# Patient Record
Sex: FEMALE | NOT HISPANIC OR LATINO | Employment: FULL TIME | URBAN - METROPOLITAN AREA
[De-identification: names, ages, dates, MRNs, and addresses within clinical notes are randomized per-mention and may not be internally consistent; named-entity substitution may affect disease eponyms.]

---

## 2023-07-16 DIAGNOSIS — E78.5 HYPERLIPIDEMIA, UNSPECIFIED: ICD-10-CM

## 2023-07-17 RX ORDER — PRAVASTATIN SODIUM 40 MG/1
TABLET ORAL
Qty: 30 TABLET | Refills: 5 | Status: SHIPPED | OUTPATIENT
Start: 2023-07-17 | End: 2024-02-05 | Stop reason: SDUPTHER

## 2024-01-06 DIAGNOSIS — E03.9 ACQUIRED HYPOTHYROIDISM: Primary | ICD-10-CM

## 2024-01-08 RX ORDER — LEVOTHYROXINE SODIUM 50 UG/1
50 TABLET ORAL
Qty: 30 TABLET | Refills: 1 | Status: SHIPPED | OUTPATIENT
Start: 2024-01-08 | End: 2024-02-01

## 2024-02-01 DIAGNOSIS — E03.9 ACQUIRED HYPOTHYROIDISM: ICD-10-CM

## 2024-02-01 RX ORDER — LEVOTHYROXINE SODIUM 50 UG/1
50 TABLET ORAL
Qty: 90 TABLET | Refills: 0 | Status: SHIPPED | OUTPATIENT
Start: 2024-02-01 | End: 2024-02-21 | Stop reason: SDUPTHER

## 2024-02-05 DIAGNOSIS — E78.5 HYPERLIPIDEMIA, UNSPECIFIED: ICD-10-CM

## 2024-02-05 RX ORDER — PRAVASTATIN SODIUM 40 MG/1
40 TABLET ORAL DAILY
Qty: 90 TABLET | Refills: 3 | Status: SHIPPED | OUTPATIENT
Start: 2024-02-05

## 2024-02-14 NOTE — PROGRESS NOTES
Subjective   Reason for Visit: Jose Flores is an 63 y.o. female here for a CPE     Chief Complaint   Patient presents with    Annual Exam       HPI  Jose is an est 62 yo female presenting today for Annual CPE     PMH: HYPOTHYROIDISM AND HLD     Occupation: Works FT as caregiver with dementia patients     Do you take any herbs or supplements that were not prescribed by a doctor? No  Colon Cancer Screening: Due   LMP:  PAP: 2022  Mammogram:  DUE    GYN: N/A   Fasting blood work: Due    Pt c/o sharp pain in RIGHT lateral thigh  Thinks it may be coming from her RIGHT lower back  Occurs most often during or after work, when she has been moving patients  Pt is wearing compression stockings and supportive shoes at work  Takes 2 ibuprofen (400 mg) and it reduces the pain  No falls    Pt also c/o burning with urination   Onset: 2 days  Will check IO UA       Office Visit on 02/15/2024   Component Date Value Ref Range Status    POC Color, Urine 02/15/2024 Yellow  Straw, Yellow, Light-Yellow Final    POC Appearance, Urine 02/15/2024 Clear  Clear Final    POC Glucose, Urine 02/15/2024 NEGATIVE  NEGATIVE mg/dl Final    POC Bilirubin, Urine 02/15/2024 NEGATIVE  NEGATIVE Final    POC Ketones, Urine 02/15/2024 TRACE (A)  NEGATIVE mg/dl Final    POC Specific Gravity, Urine 02/15/2024 1.025  1.005 - 1.035 Final    POC Blood, Urine 02/15/2024 TRACE-Intact (A)  NEGATIVE Final    POC PH, Urine 02/15/2024 6.5  No Reference Range Established PH Final    POC Protein, Urine 02/15/2024 TRACE (A)  NEGATIVE, 30 (1+) mg/dl Final    POC Urobilinogen, Urine 02/15/2024 0.2  0.2, 1.0 EU/DL Final    Poc Nitrite, Urine 02/15/2024 NEGATIVE  NEGATIVE Final    POC Leukocytes, Urine 02/15/2024 NEGATIVE  NEGATIVE Final         Patient Care Team:  ALTAGRACIA Pfeiffer-CNP as PCP - General (Family Medicine)     Review of Systems  All 13 systems were reviewed and are within normal limits except positive and pertinent negative responses which are noted  "below or in HPI.      Objective   Vitals:  /84 (BP Location: Right arm)   Pulse 85   Ht 1.626 m (5' 4\")   Wt 79.4 kg (175 lb)   SpO2 96%   BMI 30.04 kg/m²       Office Visit on 02/15/2024   Component Date Value Ref Range Status    POC Color, Urine 02/15/2024 Yellow  Straw, Yellow, Light-Yellow Final    POC Appearance, Urine 02/15/2024 Clear  Clear Final    POC Glucose, Urine 02/15/2024 NEGATIVE  NEGATIVE mg/dl Final    POC Bilirubin, Urine 02/15/2024 NEGATIVE  NEGATIVE Final    POC Ketones, Urine 02/15/2024 TRACE (A)  NEGATIVE mg/dl Final    POC Specific Gravity, Urine 02/15/2024 1.025  1.005 - 1.035 Final    POC Blood, Urine 02/15/2024 TRACE-Intact (A)  NEGATIVE Final    POC PH, Urine 02/15/2024 6.5  No Reference Range Established PH Final    POC Protein, Urine 02/15/2024 TRACE (A)  NEGATIVE, 30 (1+) mg/dl Final    POC Urobilinogen, Urine 02/15/2024 0.2  0.2, 1.0 EU/DL Final    Poc Nitrite, Urine 02/15/2024 NEGATIVE  NEGATIVE Final    POC Leukocytes, Urine 02/15/2024 NEGATIVE  NEGATIVE Final         Current Outpatient Medications   Medication Instructions    levothyroxine (SYNTHROID, LEVOXYL) 50 mcg, oral, Daily before breakfast    pravastatin (PRAVACHOL) 40 mg, oral, Daily       Physical Exam  Vitals reviewed.   HENT:      Right Ear: Tympanic membrane normal.      Left Ear: Tympanic membrane normal.      Mouth/Throat:      Mouth: Mucous membranes are moist.   Eyes:      Conjunctiva/sclera: Conjunctivae normal.   Cardiovascular:      Pulses: Normal pulses.      Heart sounds: Normal heart sounds.   Pulmonary:      Effort: Pulmonary effort is normal.   Abdominal:      General: Bowel sounds are normal.   Musculoskeletal:        Legs:    Skin:     General: Skin is warm and dry.      Capillary Refill: Capillary refill takes less than 2 seconds.   Neurological:      Mental Status: She is alert.   Psychiatric:         Mood and Affect: Mood normal.         Behavior: Behavior normal.         Thought Content: " Thought content normal.         Assessment/Plan   Problem List Items Addressed This Visit             ICD-10-CM    Dyslipidemia E78.5    Relevant Orders    Comprehensive Metabolic Panel    Lipid Panel    Hypothyroidism E03.9    Relevant Orders    TSH with reflex to Free T4 if abnormal    Annual physical exam - Primary Z00.00     Other Visit Diagnoses         Codes    Encounter for screening for malignant neoplasm of colon     Z12.11    Relevant Orders    Cologuard® colon cancer screening    Burning with urination     R30.0    Relevant Orders    POCT UA Automated manually resulted (Completed)    Breast screening     Z12.39    Relevant Orders    BI mammo bilateral screening tomosynthesis

## 2024-02-15 ENCOUNTER — OFFICE VISIT (OUTPATIENT)
Dept: PRIMARY CARE | Facility: CLINIC | Age: 64
End: 2024-02-15
Payer: COMMERCIAL

## 2024-02-15 VITALS
BODY MASS INDEX: 29.88 KG/M2 | OXYGEN SATURATION: 96 % | HEIGHT: 64 IN | SYSTOLIC BLOOD PRESSURE: 123 MMHG | DIASTOLIC BLOOD PRESSURE: 84 MMHG | WEIGHT: 175 LBS | HEART RATE: 85 BPM

## 2024-02-15 DIAGNOSIS — Z12.39 BREAST SCREENING: ICD-10-CM

## 2024-02-15 DIAGNOSIS — E78.5 DYSLIPIDEMIA: ICD-10-CM

## 2024-02-15 DIAGNOSIS — R30.0 BURNING WITH URINATION: ICD-10-CM

## 2024-02-15 DIAGNOSIS — E03.9 ACQUIRED HYPOTHYROIDISM: ICD-10-CM

## 2024-02-15 DIAGNOSIS — Z00.00 ANNUAL PHYSICAL EXAM: Primary | ICD-10-CM

## 2024-02-15 DIAGNOSIS — Z12.11 ENCOUNTER FOR SCREENING FOR MALIGNANT NEOPLASM OF COLON: ICD-10-CM

## 2024-02-15 LAB
POC APPEARANCE, URINE: CLEAR
POC BILIRUBIN, URINE: NEGATIVE
POC BLOOD, URINE: ABNORMAL
POC COLOR, URINE: YELLOW
POC GLUCOSE, URINE: NEGATIVE MG/DL
POC KETONES, URINE: ABNORMAL MG/DL
POC LEUKOCYTES, URINE: NEGATIVE
POC NITRITE,URINE: NEGATIVE
POC PH, URINE: 6.5 PH
POC PROTEIN, URINE: ABNORMAL MG/DL
POC SPECIFIC GRAVITY, URINE: 1.02
POC UROBILINOGEN, URINE: 0.2 EU/DL

## 2024-02-15 PROCEDURE — 99396 PREV VISIT EST AGE 40-64: CPT | Performed by: NURSE PRACTITIONER

## 2024-02-15 PROCEDURE — 4004F PT TOBACCO SCREEN RCVD TLK: CPT | Performed by: NURSE PRACTITIONER

## 2024-02-15 PROCEDURE — 81003 URINALYSIS AUTO W/O SCOPE: CPT | Performed by: NURSE PRACTITIONER

## 2024-02-15 NOTE — PATIENT INSTRUCTIONS
Thank you for seeing me today.  It was a pleasure to see you again!    Today we did your Annual Physical Exam and discussed the following:     Your IN-OFFICE urine test was: NEGATIVE     #IT BAND SYNDROME, RIGHT   Try OTC Aleve 1-2 tabs/day  Ice and heat alternating   Stretches as provided in office.    Schedule Mammogram    Lab work ordered today.  Please have your blood drawn in the next 1-2 weeks.  You need to be FASTING for 12 hours prior to blood draw.  You may only have water.  Please contact your insurance company to ask about the best location to get blood drawn.  We will contact you with the results of your blood work and any necessary adjustments  to your plan of care, if you do not hear from us within 3-5 days of having your blood drawn, please call the office at [phone].    RTC ANNUALLY AND AS NEEDED

## 2024-02-20 ENCOUNTER — LAB (OUTPATIENT)
Dept: LAB | Facility: LAB | Age: 64
End: 2024-02-20
Payer: COMMERCIAL

## 2024-02-20 DIAGNOSIS — E03.9 ACQUIRED HYPOTHYROIDISM: ICD-10-CM

## 2024-02-20 DIAGNOSIS — E78.5 DYSLIPIDEMIA: ICD-10-CM

## 2024-02-20 LAB
ALBUMIN SERPL BCP-MCNC: 4.2 G/DL (ref 3.4–5)
ALP SERPL-CCNC: 76 U/L (ref 33–136)
ALT SERPL W P-5'-P-CCNC: 14 U/L (ref 7–45)
ANION GAP SERPL CALC-SCNC: 10 MMOL/L (ref 10–20)
AST SERPL W P-5'-P-CCNC: 16 U/L (ref 9–39)
BILIRUB SERPL-MCNC: 0.7 MG/DL (ref 0–1.2)
BUN SERPL-MCNC: 16 MG/DL (ref 6–23)
CALCIUM SERPL-MCNC: 9.6 MG/DL (ref 8.6–10.3)
CHLORIDE SERPL-SCNC: 104 MMOL/L (ref 98–107)
CHOLEST SERPL-MCNC: 201 MG/DL (ref 0–199)
CHOLESTEROL/HDL RATIO: 3.1
CO2 SERPL-SCNC: 29 MMOL/L (ref 21–32)
CREAT SERPL-MCNC: 0.75 MG/DL (ref 0.5–1.05)
EGFRCR SERPLBLD CKD-EPI 2021: 90 ML/MIN/1.73M*2
GLUCOSE SERPL-MCNC: 95 MG/DL (ref 74–99)
HDLC SERPL-MCNC: 65 MG/DL
LDLC SERPL CALC-MCNC: 111 MG/DL
NON HDL CHOLESTEROL: 136 MG/DL (ref 0–149)
POTASSIUM SERPL-SCNC: 4.4 MMOL/L (ref 3.5–5.3)
PROT SERPL-MCNC: 6.8 G/DL (ref 6.4–8.2)
SODIUM SERPL-SCNC: 139 MMOL/L (ref 136–145)
TRIGL SERPL-MCNC: 126 MG/DL (ref 0–149)
TSH SERPL-ACNC: 3.74 MIU/L (ref 0.44–3.98)
VLDL: 25 MG/DL (ref 0–40)

## 2024-02-20 PROCEDURE — 80053 COMPREHEN METABOLIC PANEL: CPT

## 2024-02-20 PROCEDURE — 36415 COLL VENOUS BLD VENIPUNCTURE: CPT

## 2024-02-20 PROCEDURE — 84443 ASSAY THYROID STIM HORMONE: CPT

## 2024-02-20 PROCEDURE — 80061 LIPID PANEL: CPT

## 2024-02-21 DIAGNOSIS — E03.9 ACQUIRED HYPOTHYROIDISM: ICD-10-CM

## 2024-02-21 RX ORDER — LEVOTHYROXINE SODIUM 50 UG/1
50 TABLET ORAL
Qty: 90 TABLET | Refills: 3 | Status: SHIPPED | OUTPATIENT
Start: 2024-02-21

## 2024-02-29 ENCOUNTER — HOSPITAL ENCOUNTER (OUTPATIENT)
Dept: RADIOLOGY | Facility: HOSPITAL | Age: 64
Discharge: HOME | End: 2024-02-29
Payer: COMMERCIAL

## 2024-02-29 VITALS — WEIGHT: 175 LBS | HEIGHT: 64 IN | BODY MASS INDEX: 29.88 KG/M2

## 2024-02-29 DIAGNOSIS — Z12.39 BREAST SCREENING: ICD-10-CM

## 2024-02-29 PROCEDURE — 77063 BREAST TOMOSYNTHESIS BI: CPT | Performed by: RADIOLOGY

## 2024-02-29 PROCEDURE — 77067 SCR MAMMO BI INCL CAD: CPT | Performed by: RADIOLOGY

## 2024-02-29 PROCEDURE — 77067 SCR MAMMO BI INCL CAD: CPT

## 2024-03-09 LAB — NONINV COLON CA DNA+OCC BLD SCRN STL QL: NEGATIVE

## 2024-09-26 ENCOUNTER — OFFICE VISIT (OUTPATIENT)
Dept: URGENT CARE | Age: 64
End: 2024-09-26
Payer: COMMERCIAL

## 2024-09-26 VITALS
DIASTOLIC BLOOD PRESSURE: 84 MMHG | SYSTOLIC BLOOD PRESSURE: 119 MMHG | HEART RATE: 81 BPM | WEIGHT: 155 LBS | TEMPERATURE: 98.6 F | BODY MASS INDEX: 26.61 KG/M2

## 2024-09-26 DIAGNOSIS — J02.9 SORE THROAT: ICD-10-CM

## 2024-09-26 DIAGNOSIS — S16.1XXA STRAIN OF NECK MUSCLE, INITIAL ENCOUNTER: Primary | ICD-10-CM

## 2024-09-26 LAB — POC RAPID STREP: NEGATIVE

## 2024-09-26 RX ORDER — CYCLOBENZAPRINE HCL 5 MG
5 TABLET ORAL 3 TIMES DAILY
Qty: 30 TABLET | Refills: 0 | Status: SHIPPED | OUTPATIENT
Start: 2024-09-26 | End: 2024-09-26

## 2024-09-26 RX ORDER — METHYLPREDNISOLONE 4 MG/1
TABLET ORAL
Qty: 21 TABLET | Refills: 0 | Status: SHIPPED | OUTPATIENT
Start: 2024-09-26 | End: 2024-09-26

## 2024-09-26 RX ORDER — METHYLPREDNISOLONE 4 MG/1
TABLET ORAL
Qty: 21 TABLET | Refills: 0 | Status: SHIPPED | OUTPATIENT
Start: 2024-09-26

## 2024-09-26 RX ORDER — CYCLOBENZAPRINE HCL 5 MG
5 TABLET ORAL 3 TIMES DAILY
Qty: 30 TABLET | Refills: 0 | Status: SHIPPED | OUTPATIENT
Start: 2024-09-26 | End: 2024-10-06

## 2024-09-26 ASSESSMENT — ENCOUNTER SYMPTOMS
CHILLS: 0
SHORTNESS OF BREATH: 0
NECK STIFFNESS: 1
FATIGUE: 0
VOMITING: 0
DIARRHEA: 0
HEADACHES: 0
NECK PAIN: 1
ABDOMINAL PAIN: 0
BACK PAIN: 1
COUGH: 0
FEVER: 0
NAUSEA: 0
SORE THROAT: 0

## 2024-09-26 NOTE — LETTER
September 26, 2024     Patient: Jose Flores   YOB: 1960   Date of Visit: 9/26/2024       To Whom It May Concern:    It is my medical opinion that Jose Flores may return to work on 9/28/24 .    If you have any questions or concerns, please don't hesitate to call.         Sincerely,        Crystal L Severino, ALTAGRACIA-CNP    CC: No Recipients

## 2024-09-26 NOTE — PROGRESS NOTES
Subjective   Patient ID: Jose Flores is a 64 y.o. female. They present today with a chief complaint of Back Pain (Pt presents with back pain, neck pain and sore throat).    History of Present Illness  64-year-old female presents with complaints of neck pain with some radiation into her shoulders.  She states she is having difficulty with turning her head left or right and does have a slight headache.  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  She denies any headache, numbness or tingling down the arms.      History provided by:  Patient  History limited by: na.   used: No        Past Medical History  Allergies as of 09/26/2024    (No Known Allergies)       (Not in a hospital admission)       Past Medical History:   Diagnosis Date    Immunization not carried out because of patient refusal 06/22/2020    Influenza vaccination declined    Pain in unspecified knee 01/14/2014    Joint pain, knee    Personal history of (healed) traumatic fracture 02/26/2019    History of fracture of phalanx of finger    Personal history of other diseases of urinary system 02/26/2019    History of hematuria    Sprain of anterior cruciate ligament of unspecified knee, initial encounter 01/14/2014    Complete tear of anterior cruciate ligament of knee       Past Surgical History:   Procedure Laterality Date    OTHER SURGICAL HISTORY  02/26/2019    Finger surgical procedure        reports that she has been smoking cigarettes. She has a 2.5 pack-year smoking history. She has never used smokeless tobacco. She reports current alcohol use of about 3.0 standard drinks of alcohol per week. She reports that she does not use drugs.    Review of Systems  Review of Systems   Constitutional:  Negative for chills, fatigue and fever.   HENT:  Negative for congestion, ear pain, postnasal drip and sore throat.    Respiratory:  Negative for  cough and shortness of breath.    Cardiovascular:  Negative for chest pain.   Gastrointestinal:  Negative for abdominal pain, diarrhea, nausea and vomiting.   Musculoskeletal:  Positive for neck pain and neck stiffness.   Neurological:  Negative for headaches.                                  Objective    Vitals:    09/26/24 1529   BP: 119/84   BP Location: Right arm   Pulse: 81   Temp: 37 °C (98.6 °F)   Weight: 70.3 kg (155 lb)     No LMP recorded. Patient is postmenopausal.    Physical Exam  Vitals and nursing note reviewed.   Constitutional:       Appearance: Normal appearance.   HENT:      Head: Normocephalic.      Right Ear: Tympanic membrane normal.      Left Ear: Tympanic membrane normal.      Nose: Nose normal.      Mouth/Throat:      Mouth: Mucous membranes are moist.   Eyes:      Pupils: Pupils are equal, round, and reactive to light.   Neck:     Cardiovascular:      Rate and Rhythm: Normal rate and regular rhythm.   Pulmonary:      Effort: Pulmonary effort is normal.      Breath sounds: Normal breath sounds.   Abdominal:      General: Bowel sounds are normal.      Palpations: Abdomen is soft.   Musculoskeletal:      Cervical back: Neck supple. No edema, erythema, rigidity or crepitus. Pain with movement and muscular tenderness present. No spinous process tenderness. Decreased range of motion.   Skin:     General: Skin is warm and dry.      Capillary Refill: Capillary refill takes less than 2 seconds.   Neurological:      General: No focal deficit present.      Mental Status: She is alert.   Psychiatric:         Mood and Affect: Mood normal.         Procedures    Point of Care Test & Imaging Results from this visit  No results found for this visit on 09/26/24.   No results found.    Diagnostic study results (if any) were reviewed by Crystal L Severino, APRN-CNP.    Assessment/Plan   Allergies, medications, history, and pertinent labs/EKGs/Imaging reviewed by Crystal L Severino, APRN-CNP.     Medical  Decision Making  At time of discharge patient was clinically well-appearing and HDS for outpatient management. The patient and/or family was educated regarding diagnosis, supportive care, OTC and Rx medications. The patient and/or family was given the opportunity to ask questions prior to discharge.  They verbalized understanding of my discussion of the plans for treatment, expected course, indications to return to  or seek further evaluation in ED, and the need for timely follow up as directed.   They were provided with a work/school excuse if requested.      Orders and Diagnoses  Diagnoses and all orders for this visit:  Strain of neck muscle, initial encounter  -     cyclobenzaprine (Flexeril) 5 mg tablet; Take 1 tablet (5 mg) by mouth 3 times a day for 10 days.  -     methylPREDNISolone (Medrol Dospak) 4 mg tablets; Take as directed on package.  Sore throat  -     POCT rapid strep A manually resulted      Medical Admin Record      Patient disposition: Home    Electronically signed by Crystal L Severino, APRN-CNP  3:42 PM

## 2025-01-31 ENCOUNTER — APPOINTMENT (OUTPATIENT)
Dept: PRIMARY CARE | Facility: CLINIC | Age: 65
End: 2025-01-31
Payer: COMMERCIAL

## 2025-02-13 ENCOUNTER — APPOINTMENT (OUTPATIENT)
Dept: OPHTHALMOLOGY | Facility: CLINIC | Age: 65
End: 2025-02-13
Payer: COMMERCIAL

## 2025-02-13 DIAGNOSIS — H52.4 HYPEROPIA WITH PRESBYOPIA OF BOTH EYES: Primary | ICD-10-CM

## 2025-02-13 DIAGNOSIS — H52.03 HYPEROPIA WITH PRESBYOPIA OF BOTH EYES: Primary | ICD-10-CM

## 2025-02-13 PROCEDURE — 92004 COMPRE OPH EXAM NEW PT 1/>: CPT | Performed by: STUDENT IN AN ORGANIZED HEALTH CARE EDUCATION/TRAINING PROGRAM

## 2025-02-13 PROCEDURE — 92015 DETERMINE REFRACTIVE STATE: CPT | Performed by: STUDENT IN AN ORGANIZED HEALTH CARE EDUCATION/TRAINING PROGRAM

## 2025-02-13 ASSESSMENT — ENCOUNTER SYMPTOMS
HEMATOLOGIC/LYMPHATIC NEGATIVE: 0
RESPIRATORY NEGATIVE: 0
MUSCULOSKELETAL NEGATIVE: 0
NEUROLOGICAL NEGATIVE: 0
ENDOCRINE NEGATIVE: 0
PSYCHIATRIC NEGATIVE: 0
CONSTITUTIONAL NEGATIVE: 0
CARDIOVASCULAR NEGATIVE: 0
EYES NEGATIVE: 1
ALLERGIC/IMMUNOLOGIC NEGATIVE: 0
GASTROINTESTINAL NEGATIVE: 0

## 2025-02-13 ASSESSMENT — REFRACTION_MANIFEST
OD_CYLINDER: SPHERE
OS_CYLINDER: +0.50
OD_SPHERE: +1.00
OS_AXIS: 015
OS_ADD: +2.25
OD_ADD: +2.25
OS_SPHERE: +0.50

## 2025-02-13 ASSESSMENT — CONF VISUAL FIELD
METHOD: COUNTING FINGERS
OS_SUPERIOR_NASAL_RESTRICTION: 0
OD_NORMAL: 1
OD_SUPERIOR_NASAL_RESTRICTION: 0
OS_INFERIOR_TEMPORAL_RESTRICTION: 0
OD_INFERIOR_TEMPORAL_RESTRICTION: 0
OS_INFERIOR_NASAL_RESTRICTION: 0
OS_SUPERIOR_TEMPORAL_RESTRICTION: 0
OD_INFERIOR_NASAL_RESTRICTION: 0
OD_SUPERIOR_TEMPORAL_RESTRICTION: 0
OS_NORMAL: 1

## 2025-02-13 ASSESSMENT — SLIT LAMP EXAM - LIDS
COMMENTS: NORMAL
COMMENTS: NORMAL

## 2025-02-13 ASSESSMENT — VISUAL ACUITY
METHOD: SNELLEN - LINEAR
OD_SC+: -1
OD_SC: 20/25
OS_SC: 20/20

## 2025-02-13 ASSESSMENT — TONOMETRY
IOP_METHOD: TONOPEN
OD_IOP_MMHG: 15
OS_IOP_MMHG: 17

## 2025-02-13 ASSESSMENT — EXTERNAL EXAM - RIGHT EYE: OD_EXAM: NORMAL

## 2025-02-13 ASSESSMENT — CUP TO DISC RATIO
OS_RATIO: .30
OD_RATIO: .30

## 2025-02-13 ASSESSMENT — EXTERNAL EXAM - LEFT EYE: OS_EXAM: NORMAL

## 2025-02-13 NOTE — PROGRESS NOTES
Assessment/Plan   Diagnoses and all orders for this visit:  Hyperopia with presbyopia of both eyes  -New spec rx released today per patient request. Ocular health wnl for age OU. Monitor 1 year or sooner prn. Refraction billed today.    RTC 1 year for annual with DICKE and KEREN

## 2025-04-03 DIAGNOSIS — E03.9 ACQUIRED HYPOTHYROIDISM: ICD-10-CM

## 2025-04-04 RX ORDER — LEVOTHYROXINE SODIUM 50 UG/1
50 TABLET ORAL
Qty: 90 TABLET | Refills: 0 | Status: SHIPPED | OUTPATIENT
Start: 2025-04-04

## 2025-07-03 ENCOUNTER — TELEPHONE (OUTPATIENT)
Dept: PRIMARY CARE | Facility: CLINIC | Age: 65
End: 2025-07-03
Payer: COMMERCIAL

## 2025-07-03 DIAGNOSIS — E03.9 ACQUIRED HYPOTHYROIDISM: ICD-10-CM

## 2025-07-03 DIAGNOSIS — E78.5 HYPERLIPIDEMIA, UNSPECIFIED: ICD-10-CM

## 2025-07-03 RX ORDER — LEVOTHYROXINE SODIUM 50 UG/1
50 TABLET ORAL
Qty: 30 TABLET | Refills: 0 | Status: SHIPPED | OUTPATIENT
Start: 2025-07-03

## 2025-07-03 RX ORDER — PRAVASTATIN SODIUM 40 MG/1
40 TABLET ORAL DAILY
Qty: 30 TABLET | Refills: 0 | Status: SHIPPED | OUTPATIENT
Start: 2025-07-03

## 2025-07-03 NOTE — TELEPHONE ENCOUNTER
Patient called in requesting refills of levothyroxine 50 mcg, states took last one this morning. Would also like refill of Pravachol 40mg be sent to M on file.     Last office visit: 2/15/2024  Next office visit: 7/9/2025

## 2025-07-08 NOTE — PROGRESS NOTES
"Subjective   Chief Complaint   Patient presents with    Follow-up     Jose Flores is a 64 y.o. female is here today for a follow up for medication refills.        Patient ID: Jose Flores is a 64 y.o. female who presents for Follow-up (Jose Flores is a 64 y.o. female is here today for a follow up for medication refills. ).    HPI  Jose presents today for medication refill   LOV: FEB 2024     Dx: HYPOTHYROIDISM, HLD, OA    Pt presents today for refill on levothyroxine   Has not been seen for > 1 yr    Has been seeing ortho for chronic OA hip  Taking Ibuprofen and its not helping  Will try Meloxicam    Pt is due for FBW, Mammogram and vaccines        RX Allergies[1]    Review of Systems  ROS was completed and all systems are negative with the exception of what was noted in the the HPI.       Objective     Last Recorded Vitals   /81   Pulse 75   Ht 1.626 m (5' 4\")   Wt 78.5 kg (173 lb)   SpO2 98%   BMI 29.70 kg/m²      Medications  Current Outpatient Medications   Medication Instructions    levothyroxine (Synthroid, Levoxyl) 50 mcg tablet TAKE 1.5 TABLETS BY MOUTH ON SUNDAY AND 1 TABLET BY MOUTH MONDAY- SATURDAY WITH 4 OZ OF WATER. TAKE AT LEAST 30-45 MINUTES BEFORE ANY OTHER MEDICATIONS, FOOD OR BEVERAGE.    meloxicam (MOBIC) 7.5 mg, oral, Daily    pravastatin (PRAVACHOL) 40 mg, oral, Nightly       Surgical History[2]      Physical Exam  Vitals reviewed.   Cardiovascular:      Pulses: Normal pulses.      Heart sounds: Normal heart sounds.   Neurological:      Mental Status: She is alert and oriented to person, place, and time.           Assessment & Plan  Acquired hypothyroidism  TSH is due  Refill med upon lab result   Orders:    TSH with reflex to Free T4 if abnormal; Future    levothyroxine (Synthroid, Levoxyl) 50 mcg tablet; TAKE 1.5 TABLETS BY MOUTH ON SUNDAY AND 1 TABLET BY MOUTH MONDAY- SATURDAY WITH 4 OZ OF WATER. TAKE AT LEAST 30-45 MINUTES BEFORE ANY OTHER MEDICATIONS, FOOD OR " BEVERAGE.    Primary osteoarthritis of left hip  Rx Meloxicam daily  Tylenol for break-thru pain   Exercise regularly   Orders:    Comprehensive Metabolic Panel; Future    meloxicam (Mobic) 7.5 mg tablet; Take 1 tablet (7.5 mg) by mouth once daily.    Hyperlipidemia, unspecified  FLP ordered   C/W pravastatin nightly   Orders:    pravastatin (Pravachol) 40 mg tablet; Take 1 tablet (40 mg) by mouth once daily at bedtime.    Need for hepatitis C screening test    Orders:    Hepatitis C antibody; Future    Screening for human immunodeficiency virus    Orders:    HIV 1/2 Antigen/Antibody Screen with Reflex to Confirmation; Future    Lipid screening    Orders:    Lipid Panel; Future    Breast screening    Orders:    BI mammo bilateral screening tomosynthesis; Future    Need for zoster vaccine    Orders:    Zoster vaccine, Recombinant, Adult (SHINGRIX)    Need for vaccination    Orders:    Pneumococcal conjugate vaccine, 20-valent (PREVNAR 20)    I spent a total of 25 minutes on the date of the service which included preparing to see the patient, face-to-face patient care, completing clinical documentation, obtaining and/or reviewing separately obtained history, performing a medically appropriate examination, counseling and educating the patient/family/caregiver, ordering medications and/or tests, communicating with other HCPs (not separately reported), communicating results to the patient/family/caregiver and care coordination (not separately reported).     Assessment, impression and plan is reflected in the note above as well as the orders.     Plan was discussed with the patient and patient signalled understanding of the plan.              [1] No Known Allergies  [2]   Past Surgical History:  Procedure Laterality Date    OTHER SURGICAL HISTORY  02/26/2019    Finger surgical procedure

## 2025-07-09 ENCOUNTER — APPOINTMENT (OUTPATIENT)
Dept: PRIMARY CARE | Facility: CLINIC | Age: 65
End: 2025-07-09
Payer: COMMERCIAL

## 2025-07-09 VITALS
HEART RATE: 75 BPM | OXYGEN SATURATION: 98 % | SYSTOLIC BLOOD PRESSURE: 123 MMHG | HEIGHT: 64 IN | WEIGHT: 173 LBS | DIASTOLIC BLOOD PRESSURE: 81 MMHG | BODY MASS INDEX: 29.53 KG/M2

## 2025-07-09 DIAGNOSIS — Z23 NEED FOR VACCINATION: ICD-10-CM

## 2025-07-09 DIAGNOSIS — M16.12 PRIMARY OSTEOARTHRITIS OF LEFT HIP: ICD-10-CM

## 2025-07-09 DIAGNOSIS — Z11.59 NEED FOR HEPATITIS C SCREENING TEST: ICD-10-CM

## 2025-07-09 DIAGNOSIS — E03.9 ACQUIRED HYPOTHYROIDISM: Primary | ICD-10-CM

## 2025-07-09 DIAGNOSIS — Z11.4 SCREENING FOR HUMAN IMMUNODEFICIENCY VIRUS: ICD-10-CM

## 2025-07-09 DIAGNOSIS — Z13.220 LIPID SCREENING: ICD-10-CM

## 2025-07-09 DIAGNOSIS — Z12.39 BREAST SCREENING: ICD-10-CM

## 2025-07-09 DIAGNOSIS — E78.5 HYPERLIPIDEMIA, UNSPECIFIED: ICD-10-CM

## 2025-07-09 DIAGNOSIS — Z23 NEED FOR ZOSTER VACCINE: ICD-10-CM

## 2025-07-09 PROCEDURE — 3008F BODY MASS INDEX DOCD: CPT | Performed by: NURSE PRACTITIONER

## 2025-07-09 PROCEDURE — 90472 IMMUNIZATION ADMIN EACH ADD: CPT | Performed by: NURSE PRACTITIONER

## 2025-07-09 PROCEDURE — 4004F PT TOBACCO SCREEN RCVD TLK: CPT | Performed by: NURSE PRACTITIONER

## 2025-07-09 PROCEDURE — 90750 HZV VACC RECOMBINANT IM: CPT | Performed by: NURSE PRACTITIONER

## 2025-07-09 PROCEDURE — 90471 IMMUNIZATION ADMIN: CPT | Performed by: NURSE PRACTITIONER

## 2025-07-09 PROCEDURE — 90677 PCV20 VACCINE IM: CPT | Performed by: NURSE PRACTITIONER

## 2025-07-09 PROCEDURE — 99213 OFFICE O/P EST LOW 20 MIN: CPT | Performed by: NURSE PRACTITIONER

## 2025-07-09 RX ORDER — MELOXICAM 7.5 MG/1
7.5 TABLET ORAL DAILY
Qty: 30 TABLET | Refills: 0 | Status: SHIPPED | OUTPATIENT
Start: 2025-07-09 | End: 2025-08-08

## 2025-07-09 RX ORDER — PRAVASTATIN SODIUM 40 MG/1
40 TABLET ORAL NIGHTLY
Qty: 30 TABLET | Refills: 0 | Status: SHIPPED | OUTPATIENT
Start: 2025-07-09

## 2025-07-09 RX ORDER — LEVOTHYROXINE SODIUM 50 UG/1
TABLET ORAL
Qty: 30 TABLET | Refills: 0 | Status: SHIPPED | OUTPATIENT
Start: 2025-07-09 | End: 2025-07-09

## 2025-07-09 RX ORDER — LEVOTHYROXINE SODIUM 50 UG/1
TABLET ORAL
Qty: 30 TABLET | Refills: 0 | Status: SHIPPED | OUTPATIENT
Start: 2025-07-09

## 2025-07-09 ASSESSMENT — PATIENT HEALTH QUESTIONNAIRE - PHQ9
2. FEELING DOWN, DEPRESSED OR HOPELESS: NOT AT ALL
1. LITTLE INTEREST OR PLEASURE IN DOING THINGS: NOT AT ALL
SUM OF ALL RESPONSES TO PHQ9 QUESTIONS 1 AND 2: 0

## 2025-07-09 NOTE — ASSESSMENT & PLAN NOTE
Rx Meloxicam daily  Tylenol for break-thru pain   Exercise regularly   Orders:    Comprehensive Metabolic Panel; Future    meloxicam (Mobic) 7.5 mg tablet; Take 1 tablet (7.5 mg) by mouth once daily.

## 2025-07-09 NOTE — PATIENT INSTRUCTIONS
Thank you for seeing me today Jose Flores, it was a pleasure to see you again!    Try Meloxicam for hip pain     Schedule Mammogram    Prevnar 20 and Shingrix today    Lab work ordered today.  Please have your blood drawn in the next 1-2 weeks.  You need to be FASTING for 12 hours prior to blood draw.  You may only have water.  Please contact your insurance company to ask about the best location to get blood drawn.  We will contact you with the results of your blood work and any necessary adjustments  to your plan of care, if you do not hear from us within 3-5 days of having your blood drawn, please call the office at [phone].    For assistance with scheduling referrals or consultations, please call [phone] or [phone].    For laboratory, radiology, and other tests, please call [phone] ([phone] for pediatrics).   For laboratory locations, please visit [email or web address]   If you do not get results within 7-10 days, or you have any questions or concerns, please send a message, call the office ([phone]), or return to the office for a follow-up appointment.     For acute/sick visits, if you are unable to get an office visit, you can do a  On Demand Virtual Visit that is accessible via your My Chart account.  For emergencies, call 9-1-1 or go to the nearest Emergency Department.     Please schedule additional appointment(s) to address concern(s) not addressed today.    Please review prescription inserts and published information for possible adverse effects of all medications.     In general, results are discussed over the phone or via  [facility].     You can see your health information, review clinical summaries from office visits & test results online when you follow your health with MY  Chart, a personal health record.   To sign up go to [email or web address].   If you need assistance with signing up or trouble getting into your account  call [facility] Patient Line 24/7 at [phone]     RTC AS SCHEDULED FOR CPE     ~Chetna Poe NP

## 2025-07-09 NOTE — ASSESSMENT & PLAN NOTE
TSH is due  Refill med upon lab result   Orders:    TSH with reflex to Free T4 if abnormal; Future    levothyroxine (Synthroid, Levoxyl) 50 mcg tablet; TAKE 1.5 TABLETS BY MOUTH ON SUNDAY AND 1 TABLET BY MOUTH MONDAY- SATURDAY WITH 4 OZ OF WATER. TAKE AT LEAST 30-45 MINUTES BEFORE ANY OTHER MEDICATIONS, FOOD OR BEVERAGE.

## 2025-07-09 NOTE — ASSESSMENT & PLAN NOTE
FLP ordered   C/W pravastatin nightly   Orders:    pravastatin (Pravachol) 40 mg tablet; Take 1 tablet (40 mg) by mouth once daily at bedtime.

## 2025-07-16 NOTE — PROGRESS NOTES
Subjective   Reason for Visit: Jose Flores is an 64 y.o. female here for a CPE     Chief Complaint   Patient presents with    Annual Exam     Jose Flores is a 64 y.o. female is here today for a CPE. Patient reports bad reaction to the meloxicam. Irritation around eye, facial swelling. Wants something different.          HPI  Jose is a 65 yo female presenting today for Annual CPE  LOV: 2025    Dx: HYPOTHYROIDISM, HLD, OA     Current Providers  Specialists: I have reviewed specialist-related care of the patient in the medical record.   ORTHO: EFE  OPTHAL: GINNY    Pt was seen 1 week ago for med refill  Requested medication for chronic R hip pain  Rx Meloxicam and pt reports she developed a reaction   She needs a hip replacement, is followed by ortho, Dr. Ramsey, but she wants a 2nd opinion with    Pt would like to try something else for the pain, she is requesting Naproxen as she does take Ibuprofen and does not have allergy    She denies any acute c/o today     She had her FBW drawn this am; no results currently     #CPE   Occupation: Pt will be retiring next week     Do you take any herbs or supplements that were not prescribed by a doctor? Supplement from ortho, Collagen, MVI     Colon Cancer Screening: UTD    LMP: Menopause x 9 yrs   PAP:     Mammogram: DUE   DEXA: DUE     Fasting blood work: completed this am   Last eye exam:   Last dental Exam: every 6 mos  Exercise: regularly   Mood: no concerns   Sleep: no concerns   Vaccines: UTD      Health Maintenance Due   Topic Date Due    HIV Screening  Never done    Hepatitis C Screening  Never done    TSH Level  2025    Mammogram  2025       Allergies   Allergen Reactions    Meloxicam Swelling         Patient Care Team:  ALTAGRACIA Pfeiffer-CNP as PCP - General (Family Medicine)     Review of Systems  ROS was completed and all systems are negative with the exception of what was noted in the the HPI.       Objective     Last  "Recorded Vitals:  /72   Pulse 74   Ht 1.626 m (5' 4\")   Wt 77 kg (169 lb 12.8 oz)   SpO2 95%   BMI 29.15 kg/m²       Surgical History[1]    Current Outpatient Medications   Medication Instructions    levothyroxine (Synthroid, Levoxyl) 50 mcg tablet TAKE 1.5 TABLETS BY MOUTH ON SUNDAY AND 1 TABLET BY MOUTH MONDAY- SATURDAY WITH 4 OZ OF WATER. TAKE AT LEAST 30-45 MINUTES BEFORE ANY OTHER MEDICATIONS, FOOD OR BEVERAGE.    naproxen (NAPROSYN) 500 mg, oral, 2 times daily PRN    pravastatin (PRAVACHOL) 40 mg, oral, Nightly       Physical Exam  Vitals reviewed.   HENT:      Right Ear: Tympanic membrane normal.      Left Ear: Tympanic membrane normal.     Cardiovascular:      Pulses: Normal pulses.      Heart sounds: Normal heart sounds.   Pulmonary:      Effort: Pulmonary effort is normal.      Breath sounds: Normal breath sounds.   Abdominal:      General: Abdomen is flat.      Palpations: Abdomen is soft.     Musculoskeletal:         General: Normal range of motion.     Skin:     General: Skin is warm and dry.     Neurological:      Mental Status: She is alert and oriented to person, place, and time.     Psychiatric:         Mood and Affect: Mood normal.         Thought Content: Thought content normal.         Assessment & Plan  Annual physical exam  - Counseled on healthy diet and regular exercise  - Fall avoidance information provided  - Personalized prevention plan provided   - Mammogram ordered  - DEXA ordered   - Colon and PAP are UTD  - Vaccines UTD   - FBW pending        Primary osteoarthritis of left hip  Rx Naproxen as needed   Referral for ortho for 2nd opinion   Orders:    naproxen (Naprosyn) 500 mg tablet; Take 1 tablet (500 mg) by mouth 2 times a day as needed for moderate pain (4 - 6) (pain).    Referral to Orthopedics and Sports Medicine; Future    Acquired hypothyroidism  TSH result pending        Mixed hyperlipidemia  FLP result pending        Asymptomatic age-related postmenopausal " state  Schedule DEXA    Orders:    XR DEXA bone density; Future                  [1]   Past Surgical History:  Procedure Laterality Date    OTHER SURGICAL HISTORY  02/26/2019    Finger surgical procedure

## 2025-07-17 ENCOUNTER — APPOINTMENT (OUTPATIENT)
Dept: PRIMARY CARE | Facility: CLINIC | Age: 65
End: 2025-07-17
Payer: COMMERCIAL

## 2025-07-17 VITALS
HEIGHT: 64 IN | HEART RATE: 74 BPM | OXYGEN SATURATION: 95 % | DIASTOLIC BLOOD PRESSURE: 72 MMHG | BODY MASS INDEX: 28.99 KG/M2 | WEIGHT: 169.8 LBS | SYSTOLIC BLOOD PRESSURE: 107 MMHG

## 2025-07-17 DIAGNOSIS — Z00.00 ANNUAL PHYSICAL EXAM: Primary | ICD-10-CM

## 2025-07-17 DIAGNOSIS — E78.2 MIXED HYPERLIPIDEMIA: ICD-10-CM

## 2025-07-17 DIAGNOSIS — E03.9 ACQUIRED HYPOTHYROIDISM: ICD-10-CM

## 2025-07-17 DIAGNOSIS — Z78.0 ASYMPTOMATIC AGE-RELATED POSTMENOPAUSAL STATE: ICD-10-CM

## 2025-07-17 DIAGNOSIS — M16.12 PRIMARY OSTEOARTHRITIS OF LEFT HIP: ICD-10-CM

## 2025-07-17 PROCEDURE — 3008F BODY MASS INDEX DOCD: CPT | Performed by: NURSE PRACTITIONER

## 2025-07-17 PROCEDURE — 99396 PREV VISIT EST AGE 40-64: CPT | Performed by: NURSE PRACTITIONER

## 2025-07-17 RX ORDER — NAPROXEN 500 MG/1
500 TABLET ORAL 2 TIMES DAILY PRN
Qty: 60 TABLET | Refills: 0 | Status: SHIPPED | OUTPATIENT
Start: 2025-07-17 | End: 2025-10-15

## 2025-07-17 NOTE — ASSESSMENT & PLAN NOTE
Rx Naproxen as needed   Referral for ortho for 2nd opinion   Orders:    naproxen (Naprosyn) 500 mg tablet; Take 1 tablet (500 mg) by mouth 2 times a day as needed for moderate pain (4 - 6) (pain).    Referral to Orthopedics and Sports Medicine; Future

## 2025-07-17 NOTE — ASSESSMENT & PLAN NOTE
- Counseled on healthy diet and regular exercise  - Fall avoidance information provided  - Personalized prevention plan provided   - Mammogram ordered  - DEXA ordered   - Colon and PAP are UTD  - Vaccines UTD   - FBW pending

## 2025-07-17 NOTE — PATIENT INSTRUCTIONS
Thank you for seeing me today Jose Flores, it was a pleasure to see you again!    Today we did your Annual Physical Exam and discussed the following:     Try Naproxen for chronic hip pain  Call if any reaction    I will review your labs and call with results next week     Schedule Mammogram and DEXA     For assistance with scheduling referrals or consultations, please call [phone] or [phone].    For laboratory, radiology, and other tests, please call [phone] ([phone] for pediatrics).   For laboratory locations, please visit [email or web address]   If you do not get results within 7-10 days, or you have any questions or concerns, please send a message, call the office ([phone]), or return to the office for a follow-up appointment.     For acute/sick visits, if you are unable to get an office visit, you can do a  On Demand Virtual Visit that is accessible via your My Chart account.  For emergencies, call 9-1-1 or go to the nearest Emergency Department.     Please schedule additional appointment(s) to address concern(s) not addressed today.    Please review prescription inserts and published information for possible adverse effects of all medications.     In general, results are discussed over the phone or via  [facility].     You can see your health information, review clinical summaries from office visits & test results online when you follow your health with MY  Chart, a personal health record.   To sign up go to [email or web address].   If you need assistance with signing up or trouble getting into your account call [facility] Patient Line 24/7 at [phone]     RTC ANNUALLY AND AS NEEDED     ~Chetna Poe NP

## 2025-07-18 LAB
ALBUMIN SERPL-MCNC: 4.3 G/DL (ref 3.6–5.1)
ALP SERPL-CCNC: 83 U/L (ref 37–153)
ALT SERPL-CCNC: 21 U/L (ref 6–29)
ANION GAP SERPL CALCULATED.4IONS-SCNC: 10 MMOL/L (CALC) (ref 7–17)
AST SERPL-CCNC: 20 U/L (ref 10–35)
BILIRUB SERPL-MCNC: 0.7 MG/DL (ref 0.2–1.2)
BUN SERPL-MCNC: 16 MG/DL (ref 7–25)
CALCIUM SERPL-MCNC: 9.5 MG/DL (ref 8.6–10.4)
CHLORIDE SERPL-SCNC: 104 MMOL/L (ref 98–110)
CHOLEST SERPL-MCNC: 217 MG/DL
CHOLEST/HDLC SERPL: 3.7 (CALC)
CO2 SERPL-SCNC: 26 MMOL/L (ref 20–32)
CREAT SERPL-MCNC: 0.68 MG/DL (ref 0.5–1.05)
EGFRCR SERPLBLD CKD-EPI 2021: 97 ML/MIN/1.73M2
GLUCOSE SERPL-MCNC: 88 MG/DL (ref 65–99)
HCV AB SERPL QL IA: NORMAL
HDLC SERPL-MCNC: 59 MG/DL
HIV 1+2 AB+HIV1 P24 AG SERPL QL IA: NORMAL
HIV 1+2 AB+HIV1 P24 AG SERPL QL IA: NORMAL
LDLC SERPL CALC-MCNC: 130 MG/DL (CALC)
NONHDLC SERPL-MCNC: 158 MG/DL (CALC)
POTASSIUM SERPL-SCNC: 4.4 MMOL/L (ref 3.5–5.3)
PROT SERPL-MCNC: 7 G/DL (ref 6.1–8.1)
SODIUM SERPL-SCNC: 140 MMOL/L (ref 135–146)
T4 FREE SERPL-MCNC: 1.1 NG/DL (ref 0.8–1.8)
TRIGL SERPL-MCNC: 161 MG/DL
TSH SERPL-ACNC: 5.04 MIU/L (ref 0.4–4.5)

## 2025-07-22 ENCOUNTER — TELEPHONE (OUTPATIENT)
Dept: PRIMARY CARE | Facility: CLINIC | Age: 65
End: 2025-07-22
Payer: COMMERCIAL

## 2025-07-30 ENCOUNTER — HOSPITAL ENCOUNTER (OUTPATIENT)
Dept: RADIOLOGY | Facility: HOSPITAL | Age: 65
Discharge: HOME | End: 2025-07-30
Payer: COMMERCIAL

## 2025-07-30 DIAGNOSIS — Z78.0 ASYMPTOMATIC AGE-RELATED POSTMENOPAUSAL STATE: ICD-10-CM

## 2025-07-30 PROCEDURE — 77080 DXA BONE DENSITY AXIAL: CPT

## 2025-07-30 PROCEDURE — 77080 DXA BONE DENSITY AXIAL: CPT | Performed by: RADIOLOGY

## 2025-07-31 ENCOUNTER — RESULTS FOLLOW-UP (OUTPATIENT)
Dept: PRIMARY CARE | Facility: CLINIC | Age: 65
End: 2025-07-31
Payer: COMMERCIAL